# Patient Record
Sex: FEMALE | Race: WHITE | Employment: UNEMPLOYED | URBAN - METROPOLITAN AREA
[De-identification: names, ages, dates, MRNs, and addresses within clinical notes are randomized per-mention and may not be internally consistent; named-entity substitution may affect disease eponyms.]

---

## 2024-04-19 ENCOUNTER — TELEMEDICINE (OUTPATIENT)
Age: 34
End: 2024-04-19

## 2024-04-19 DIAGNOSIS — J20.9 ACUTE BRONCHITIS, UNSPECIFIED ORGANISM: Primary | ICD-10-CM

## 2024-04-19 RX ORDER — PREDNISONE 10 MG/1
TABLET ORAL
Qty: 21 EACH | Refills: 0 | Status: SHIPPED | OUTPATIENT
Start: 2024-04-19

## 2024-04-19 RX ORDER — BENZONATATE 100 MG/1
100 CAPSULE ORAL 2 TIMES DAILY PRN
Qty: 20 CAPSULE | Refills: 0 | Status: SHIPPED | OUTPATIENT
Start: 2024-04-19 | End: 2024-04-26

## 2024-04-19 NOTE — PROGRESS NOTES
Elba Powell is a 33 y.o. female who was seen by synchronous (real-time) audio-video technology on 2024 for Cough (Sx for month and now abdominal and neck pain/Has not tested for Covid, is not taking seasonal allergy medication)        Progress Note         PROGRESS NOTE  Name: Elba Powell   : 1990       ASSESSMENT/ PLAN:     Assessment: Cough, due to acute bronchitis/asthma exacerbation    Plan: We will give her a prednisone taper.  She is to continue symptomatic measures including rest, fluids, OTC analgesics, and her usual asthma inhalers.  We will give her Tessalon Perles for symptomatic relief of cough.  Work note given.     Return if symptoms worsen or fail to improve.     I have reviewed the patient's medications and risks/side effects/benefits were discussed. Diagnosis(-es) explained to patient and questions answered. Literature provided where appropriate.                       SUBJECTIVE  Ms. Elba Powell is a pt of Dontrell Guidry MD and presents today acutely for     Chief Complaint   Patient presents with    Cough     Sx for month and now abdominal and neck pain  Has not tested for Covid, is not taking seasonal allergy medication     Ms. Elba Powell is a pt. of Dontrell Guidry MD with a past medical history of asthma, who presents with upper respiratory complaints.  She has had a cough which is frequent and disruptive.  It is mostly nonproductive.  She also has some wheezing and shortness of breath.  She denies other upper respiratory symptoms such as runny nose, sore throat, headaches, or ear pain.  She denies fevers or chills at this time. It is reported that her symptoms began over a month ago.      She was seen in the emergency room on  and was given a prescription for a short course of prednisone, and promethazine-DM syrup.  Her symptoms improved temporarily.        OBJECTIVE  There were no vitals taken for this visit.  Gen: Pleasant 33 y.o.  female in

## 2024-06-04 ENCOUNTER — OFFICE VISIT (OUTPATIENT)
Age: 34
End: 2024-06-04
Payer: MEDICAID

## 2024-06-04 VITALS
OXYGEN SATURATION: 98 % | HEART RATE: 93 BPM | RESPIRATION RATE: 16 BRPM | BODY MASS INDEX: 24.96 KG/M2 | SYSTOLIC BLOOD PRESSURE: 126 MMHG | TEMPERATURE: 98.4 F | DIASTOLIC BLOOD PRESSURE: 82 MMHG | WEIGHT: 132.2 LBS | HEIGHT: 61 IN

## 2024-06-04 DIAGNOSIS — G43.109 MIGRAINE WITH AURA AND WITHOUT STATUS MIGRAINOSUS, NOT INTRACTABLE: ICD-10-CM

## 2024-06-04 DIAGNOSIS — G44.209 TENSION VASCULAR HEADACHE: Primary | ICD-10-CM

## 2024-06-04 PROCEDURE — 99213 OFFICE O/P EST LOW 20 MIN: CPT | Performed by: PSYCHIATRY & NEUROLOGY

## 2024-06-04 ASSESSMENT — PATIENT HEALTH QUESTIONNAIRE - PHQ9
SUM OF ALL RESPONSES TO PHQ QUESTIONS 1-9: 0
SUM OF ALL RESPONSES TO PHQ QUESTIONS 1-9: 0
SUM OF ALL RESPONSES TO PHQ9 QUESTIONS 1 & 2: 0
2. FEELING DOWN, DEPRESSED OR HOPELESS: NOT AT ALL
SUM OF ALL RESPONSES TO PHQ QUESTIONS 1-9: 0
SUM OF ALL RESPONSES TO PHQ QUESTIONS 1-9: 0
1. LITTLE INTEREST OR PLEASURE IN DOING THINGS: NOT AT ALL

## 2024-06-05 NOTE — PROGRESS NOTES
Consult  REFERRED BY:  Dontrell Guidry MD    CHIEF COMPLAINT: 6 months follow-up for headaches which are doing much better on the Maxalt she takes as needed for the headaches.      Subjective:     Elba Powell is a 33 y.o. right-handed  female we are seeing at the request of Dr. Guidry for evaluation of migraine headaches that she has had for at least 10 years, and they seem to be doing better on the Maxalt she takes as needed for the headaches, and I think part of the problem that she has gotten  next Year joint has about 1 headache a month, did have to go to emergency room 1 time since we saw her 6 months ago but otherwise is doing very well with her headaches.  She does not have any visual disturbances or focal weakness or sensory loss of morose normally with the headaches.  He does not think she clenches her teeth or has bruxism but she is has a fair amount of stress do not unusual months recently also.  She takes ibuprofen and Tylenol and Imitrex now,  Gets partial relief of her headaches is looking for better treatment to prevent the headaches.  She has a history of kidney stones cannot take Topamax or Zonegran for headache prevention because of that.  She has never tried any preventative agents.  She has not tried any other triptan except for Imitrex we will try her on Maxalt and see how she does on that, and we will avoid preventive agents for now, but she might be a candidate for amitriptyline or Pamelor in the near future if her headaches persist.  She has nausea but no vomiting with the headaches, but does have sound sensitivity and light sensitivity with her headaches more typical of vascular headaches.  She apparently had a severe headache and went to the emergency room at [facility] and had a normal CT of the head done there but never had an MRI.  She has no other family history of headaches.  She does find that laying down in a dark quiet room helps a lot.  Patient has a